# Patient Record
Sex: FEMALE | Race: WHITE | URBAN - METROPOLITAN AREA
[De-identification: names, ages, dates, MRNs, and addresses within clinical notes are randomized per-mention and may not be internally consistent; named-entity substitution may affect disease eponyms.]

---

## 2020-10-19 ENCOUNTER — PROCEDURE (OUTPATIENT)
Dept: URBAN - METROPOLITAN AREA CLINIC 41 | Facility: CLINIC | Age: 80
End: 2020-10-19
Payer: MEDICARE

## 2020-10-19 ASSESSMENT — INTRAOCULAR PRESSURE
OS: 14
OD: 18

## 2020-11-02 ENCOUNTER — PROCEDURE (OUTPATIENT)
Dept: URBAN - METROPOLITAN AREA CLINIC 41 | Facility: CLINIC | Age: 80
End: 2020-11-02
Payer: MEDICARE

## 2020-11-02 PROCEDURE — 67028 INJECTION EYE DRUG: CPT | Performed by: OPHTHALMOLOGY

## 2020-11-02 ASSESSMENT — INTRAOCULAR PRESSURE
OD: 15
OS: 13

## 2020-11-16 ENCOUNTER — PROCEDURE (OUTPATIENT)
Dept: URBAN - METROPOLITAN AREA CLINIC 41 | Facility: CLINIC | Age: 80
End: 2020-11-16
Payer: MEDICARE

## 2020-11-16 PROCEDURE — 92134 CPTRZ OPH DX IMG PST SGM RTA: CPT | Performed by: OPHTHALMOLOGY

## 2020-11-16 ASSESSMENT — INTRAOCULAR PRESSURE
OS: 12
OD: 13

## 2020-12-07 ENCOUNTER — PROCEDURE (OUTPATIENT)
Dept: URBAN - METROPOLITAN AREA CLINIC 41 | Facility: CLINIC | Age: 80
End: 2020-12-07
Payer: MEDICARE

## 2020-12-07 PROCEDURE — 67028 INJECTION EYE DRUG: CPT | Performed by: OPHTHALMOLOGY

## 2020-12-07 ASSESSMENT — INTRAOCULAR PRESSURE
OS: 9
OD: 11

## 2020-12-21 ENCOUNTER — OFFICE VISIT (OUTPATIENT)
Dept: URBAN - METROPOLITAN AREA CLINIC 41 | Facility: CLINIC | Age: 80
End: 2020-12-21
Payer: MEDICARE

## 2020-12-21 DIAGNOSIS — Z96.1 PRESENCE OF INTRAOCULAR LENS: ICD-10-CM

## 2020-12-21 PROCEDURE — 92012 INTRM OPH EXAM EST PATIENT: CPT | Performed by: OPHTHALMOLOGY

## 2020-12-21 PROCEDURE — 92134 CPTRZ OPH DX IMG PST SGM RTA: CPT | Performed by: OPHTHALMOLOGY

## 2020-12-21 ASSESSMENT — INTRAOCULAR PRESSURE
OS: 13
OD: 19

## 2020-12-21 NOTE — IMPRESSION/PLAN
Impression: Vitreous degeneration, bilateral: H43.813. Plan: Patient notes floaters have improved. Exam demonstrates no RT or RD. Precautions discussed.

## 2020-12-21 NOTE — IMPRESSION/PLAN
Impression: PCIOL OU Plan: Stable. Patient notes a decline in vision. Fortunately, there is no change in her PCIOL to explain this.

## 2020-12-21 NOTE — IMPRESSION/PLAN
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 Status: Symptomatic.
s/p Eylea, OU last 10/28/19
s/p Avastin OS last 11/11/19
-- OS has history of improved with q 2 wk antiVEGF injections (alternating between Avastin and Eylea) -- OD no longer requires 2 week alternate injections. -- Cosopt 10/10/16 trial did not improve - s/p Beovu OU last 02/03/20
s/p Eylea 05/18/20 Plan: Patient stable vision OU. Exam and OCT reveal: OD: Resolved IRF/SRF in the right eye s/p Eylea at q4 weeks. OS:  Vision does appear to be worse OS. OCT demonstrates resolved IRF in the left eye s/p Avastin 2 weeks ago. Will require avastin 2 weeks after Eylea injection in the future. Recommend continuing with treatment of Eylea in both eyes today. R/b/A. Patient elects to proceed. Intravitreal Eylea injection performed today into both eyes without complication. 

RTC: 2-3 wks straight Avastin OS, then 2 wks later for Eylea OU, then 3 wks later for Avastin OS, then 2 wks later for [facility] OU, will re-evaluate in March

## 2021-01-11 ENCOUNTER — PROCEDURE (OUTPATIENT)
Dept: URBAN - METROPOLITAN AREA CLINIC 41 | Facility: CLINIC | Age: 81
End: 2021-01-11
Payer: MEDICARE

## 2021-01-11 PROCEDURE — 67028 INJECTION EYE DRUG: CPT | Performed by: OPHTHALMOLOGY

## 2021-01-11 ASSESSMENT — INTRAOCULAR PRESSURE
OS: 12
OD: 15

## 2021-02-01 ENCOUNTER — PROCEDURE (OUTPATIENT)
Dept: URBAN - METROPOLITAN AREA CLINIC 41 | Facility: CLINIC | Age: 81
End: 2021-02-01
Payer: MEDICARE

## 2021-02-01 PROCEDURE — 92134 CPTRZ OPH DX IMG PST SGM RTA: CPT | Performed by: OPHTHALMOLOGY

## 2021-02-01 ASSESSMENT — INTRAOCULAR PRESSURE
OD: 15
OS: 15

## 2021-02-22 ENCOUNTER — PROCEDURE (OUTPATIENT)
Dept: URBAN - METROPOLITAN AREA CLINIC 41 | Facility: CLINIC | Age: 81
End: 2021-02-22
Payer: MEDICARE

## 2021-02-22 PROCEDURE — 67028 INJECTION EYE DRUG: CPT | Performed by: OPHTHALMOLOGY

## 2021-02-22 PROCEDURE — 92134 CPTRZ OPH DX IMG PST SGM RTA: CPT | Performed by: OPHTHALMOLOGY

## 2021-02-22 ASSESSMENT — INTRAOCULAR PRESSURE
OD: 10
OS: 9

## 2021-03-08 ENCOUNTER — PROCEDURE (OUTPATIENT)
Dept: URBAN - METROPOLITAN AREA CLINIC 41 | Facility: CLINIC | Age: 81
End: 2021-03-08
Payer: MEDICARE

## 2021-03-08 PROCEDURE — 92134 CPTRZ OPH DX IMG PST SGM RTA: CPT | Performed by: OPHTHALMOLOGY

## 2021-03-08 ASSESSMENT — INTRAOCULAR PRESSURE
OS: 10
OD: 13

## 2021-03-29 ENCOUNTER — OFFICE VISIT (OUTPATIENT)
Dept: URBAN - METROPOLITAN AREA CLINIC 41 | Facility: CLINIC | Age: 81
End: 2021-03-29
Payer: MEDICARE

## 2021-03-29 DIAGNOSIS — H04.123 DRY EYE SYNDROME OF BILATERAL LACRIMAL GLANDS: ICD-10-CM

## 2021-03-29 PROCEDURE — 92134 CPTRZ OPH DX IMG PST SGM RTA: CPT | Performed by: OPHTHALMOLOGY

## 2021-03-29 PROCEDURE — 92012 INTRM OPH EXAM EST PATIENT: CPT | Performed by: OPHTHALMOLOGY

## 2021-03-29 PROCEDURE — 67028 INJECTION EYE DRUG: CPT | Performed by: OPHTHALMOLOGY

## 2021-03-29 ASSESSMENT — INTRAOCULAR PRESSURE
OS: 9
OD: 11

## 2021-03-29 NOTE — IMPRESSION/PLAN
Impression: Exudative age-rel mclr degn, bi, with actv chrdl neovas: K11.7838 OU. Status: Symptomatic.
s/p Eylea, OU last 10/28/19
s/p Avastin OS last 11/11/19
-- OS has history of improved with q 2 wk antiVEGF injections (alternating between Avastin and Eylea) -- OD no longer requires 2 week alternate injections. -- Cosopt 10/10/16 trial did not improve - s/p Beovu OU last 02/03/20
s/p Eylea 05/18/20 Plan: Patient stable vision OU. Exam and OCT reveal: OD: Resolved IRF/SRF in the right eye s/p Eylea at q4 weeks. OS:  Vision is stable OS. OCT demonstrates resolved IRF in the left eye s/p Eylea 3 weeks ago. Will require avastin 3 weeks after Eylea injection in the future. Recommend continuing with treatment of Avastin OS today and q 5 wk Eylea in both eyes. R/b/A. Patient elects to proceed. Intravitreal Avastin injection performed today into left eye without complication. 

RTC: 2wks straight Eylea OU, then 3 wks straight Avastin OS, then 2 wks later for Eylea OU, then 3 wks later for Avastin OS, then 2 wks later for [facility] OU, will re-evaluate in March

## 2021-04-12 ENCOUNTER — PROCEDURE (OUTPATIENT)
Dept: URBAN - METROPOLITAN AREA CLINIC 41 | Facility: CLINIC | Age: 81
End: 2021-04-12
Payer: MEDICARE

## 2021-04-12 DIAGNOSIS — H35.3231 EXUDATIVE AGE-RELATED MACULAR DEGENERATION, BILATERAL, WITH ACTIVE CHOROIDAL NEOVASCULARIZATION: Primary | ICD-10-CM

## 2021-04-12 PROCEDURE — 92134 CPTRZ OPH DX IMG PST SGM RTA: CPT | Performed by: OPHTHALMOLOGY

## 2021-04-12 ASSESSMENT — INTRAOCULAR PRESSURE
OS: 12
OD: 13

## 2021-05-03 ENCOUNTER — OFFICE VISIT (OUTPATIENT)
Dept: URBAN - METROPOLITAN AREA CLINIC 41 | Facility: CLINIC | Age: 81
End: 2021-05-03
Payer: MEDICARE

## 2021-05-03 PROCEDURE — 67028 INJECTION EYE DRUG: CPT | Performed by: OPHTHALMOLOGY

## 2021-05-03 ASSESSMENT — INTRAOCULAR PRESSURE
OS: 15
OD: 16

## 2021-05-17 ENCOUNTER — PROCEDURE (OUTPATIENT)
Dept: URBAN - METROPOLITAN AREA CLINIC 41 | Facility: CLINIC | Age: 81
End: 2021-05-17
Payer: MEDICARE

## 2021-05-17 PROCEDURE — 92134 CPTRZ OPH DX IMG PST SGM RTA: CPT | Performed by: OPHTHALMOLOGY

## 2021-05-17 ASSESSMENT — INTRAOCULAR PRESSURE
OS: 14
OD: 13

## 2021-06-07 ENCOUNTER — PROCEDURE (OUTPATIENT)
Dept: URBAN - METROPOLITAN AREA CLINIC 41 | Facility: CLINIC | Age: 81
End: 2021-06-07
Payer: MEDICARE

## 2021-06-07 PROCEDURE — 92134 CPTRZ OPH DX IMG PST SGM RTA: CPT | Performed by: OPHTHALMOLOGY

## 2021-06-07 PROCEDURE — 67028 INJECTION EYE DRUG: CPT | Performed by: OPHTHALMOLOGY

## 2021-06-07 ASSESSMENT — INTRAOCULAR PRESSURE
OD: 15
OS: 14

## 2021-06-21 ENCOUNTER — PROCEDURE (OUTPATIENT)
Dept: URBAN - METROPOLITAN AREA CLINIC 41 | Facility: CLINIC | Age: 81
End: 2021-06-21
Payer: MEDICARE

## 2021-06-21 PROCEDURE — 92134 CPTRZ OPH DX IMG PST SGM RTA: CPT | Performed by: OPHTHALMOLOGY

## 2021-06-21 ASSESSMENT — INTRAOCULAR PRESSURE
OS: 13
OD: 14

## 2021-07-19 ENCOUNTER — PROCEDURE (OUTPATIENT)
Dept: URBAN - METROPOLITAN AREA CLINIC 41 | Facility: CLINIC | Age: 81
End: 2021-07-19
Payer: MEDICARE

## 2021-07-19 PROCEDURE — 92134 CPTRZ OPH DX IMG PST SGM RTA: CPT | Performed by: OPHTHALMOLOGY

## 2021-07-19 ASSESSMENT — INTRAOCULAR PRESSURE
OD: 14
OS: 13

## 2021-08-02 ENCOUNTER — OFFICE VISIT (OUTPATIENT)
Dept: URBAN - METROPOLITAN AREA CLINIC 41 | Facility: CLINIC | Age: 81
End: 2021-08-02
Payer: MEDICARE

## 2021-08-02 PROCEDURE — 92134 CPTRZ OPH DX IMG PST SGM RTA: CPT | Performed by: OPHTHALMOLOGY

## 2021-08-02 PROCEDURE — 92014 COMPRE OPH EXAM EST PT 1/>: CPT | Performed by: OPHTHALMOLOGY

## 2021-08-02 ASSESSMENT — INTRAOCULAR PRESSURE
OD: 13
OS: 13

## 2021-08-02 NOTE — IMPRESSION/PLAN
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 Status: Symptomatic.
s/p Eylea, OU last 06/21/2021
s/p Avastin OS last 07/19/2021
s/p Beovu OU last 02/03/20
-- OS has history of improved with q 2 wk antiVEGF injections (alternating between Avastin and Eylea) -- OD no longer requires 2 week alternate injections. -- Cosopt 10/10/16 trial did not improve Plan: Patient stable vision OU. Exam and OCT reveal: OD: Resolved IRF/SRF in the right eye s/p Eylea at q5-6 weeks. OS:  Vision is stable OS. OCT demonstrates resolved IRF in the left eye s/p Avastin 2 wks ago. Will require avastin 3 weeks after Eylea injection in the future. Recommend continuing with treatment of Eylea in both eyes today. R/b/A. Patient elects to proceed. Intravitreal Eylea injection performed today into OU without complication. 

RTC: 2wks straight Avastin, then 2 wks later for Eylea OU, then 3 wks later for Avastin OS, then 2 wks later for [facility] OU, will re-evaluate in ~October

## 2021-08-02 NOTE — IMPRESSION/PLAN
Impression: Dry eye syndrome of bilateral lacrimal glands: H04.123. Plan: Patient notes tearing. Exam demonstrates PEE. Discussed R/B/A of ATs, PFATs, Restasis, vs punctal plugs.  Rec ATs + restasis

## 2021-08-02 NOTE — IMPRESSION/PLAN
Impression: Vitreous degeneration, bilateral: H43.813. Plan: Patient notes decrease floaters and flashes. Exam demonstrates no RT or RD. Precautions discussed.

## 2021-08-16 ENCOUNTER — OFFICE VISIT (OUTPATIENT)
Dept: URBAN - METROPOLITAN AREA CLINIC 41 | Facility: CLINIC | Age: 81
End: 2021-08-16
Payer: MEDICARE

## 2021-08-16 PROCEDURE — 92134 CPTRZ OPH DX IMG PST SGM RTA: CPT | Performed by: OPHTHALMOLOGY

## 2021-08-16 ASSESSMENT — INTRAOCULAR PRESSURE
OS: 14
OD: 14

## 2021-08-30 ENCOUNTER — PROCEDURE (OUTPATIENT)
Dept: URBAN - METROPOLITAN AREA CLINIC 41 | Facility: CLINIC | Age: 81
End: 2021-08-30
Payer: MEDICARE

## 2021-08-30 PROCEDURE — 92134 CPTRZ OPH DX IMG PST SGM RTA: CPT | Performed by: OPHTHALMOLOGY

## 2021-08-30 ASSESSMENT — INTRAOCULAR PRESSURE
OS: 12
OD: 16

## 2021-09-20 ENCOUNTER — PROCEDURE (OUTPATIENT)
Dept: URBAN - METROPOLITAN AREA CLINIC 41 | Facility: CLINIC | Age: 81
End: 2021-09-20
Payer: MEDICARE

## 2021-09-20 PROCEDURE — 67028 INJECTION EYE DRUG: CPT | Performed by: OPHTHALMOLOGY

## 2021-09-20 PROCEDURE — 92134 CPTRZ OPH DX IMG PST SGM RTA: CPT | Performed by: OPHTHALMOLOGY

## 2021-09-20 ASSESSMENT — INTRAOCULAR PRESSURE
OS: 15
OD: 14

## 2021-10-04 ENCOUNTER — PROCEDURE (OUTPATIENT)
Dept: URBAN - METROPOLITAN AREA CLINIC 41 | Facility: CLINIC | Age: 81
End: 2021-10-04
Payer: MEDICARE

## 2021-10-04 ASSESSMENT — INTRAOCULAR PRESSURE
OS: 14
OD: 15

## 2021-10-25 ENCOUNTER — OFFICE VISIT (OUTPATIENT)
Dept: URBAN - METROPOLITAN AREA CLINIC 41 | Facility: CLINIC | Age: 81
End: 2021-10-25
Payer: MEDICARE

## 2021-10-25 PROCEDURE — 92012 INTRM OPH EXAM EST PATIENT: CPT | Performed by: OPHTHALMOLOGY

## 2021-10-25 PROCEDURE — 92134 CPTRZ OPH DX IMG PST SGM RTA: CPT | Performed by: OPHTHALMOLOGY

## 2021-10-25 PROCEDURE — 67028 INJECTION EYE DRUG: CPT | Performed by: OPHTHALMOLOGY

## 2021-10-25 ASSESSMENT — INTRAOCULAR PRESSURE
OS: 12
OD: 15

## 2021-10-25 NOTE — IMPRESSION/PLAN
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 Status: Symptomatic.
s/p Eylea, OU last 10/04/2021
s/p Avastin OS last 09/20/2021
s/p Beovu OU last 02/03/20
-- OS has history of improved with q 2 wk antiVEGF injections (alternating between Avastin and Eylea) -- OD no longer requires 2 week alternate injections. -- Cosopt 10/10/16 trial did not improve Plan: Patient stable vision OU. Exam and OCT reveal: OD: Resolved IRF/SRF in the right eye s/p Eylea at q 5-6 week interval; last Eylea 10/4/2021 ~ 3 wks ago. OS:  Vision is stable OS. OCT demonstrates resolved IRF in the left eye s/p Eylea 3 wks ago. She is usually 2wks s/p Avastin and 3 wks after each Eylea. Will require avastin 3 weeks after Eylea injection in the future. Recommend Avastin OS today and continuing with treatment of Eylea in both eyes at next visit. R/b/A. Patient elects to proceed. Intravitreal Avastin injected OS today without complication.  

RTC: 2wks straight Eylea OU, then 3 wks later for Avastin OS, then 2 wks later for [facility] OU, will re-evaluate in ~January to February

## 2021-11-08 ENCOUNTER — PROCEDURE (OUTPATIENT)
Dept: URBAN - METROPOLITAN AREA CLINIC 41 | Facility: CLINIC | Age: 81
End: 2021-11-08
Payer: MEDICARE

## 2021-11-08 PROCEDURE — 92134 CPTRZ OPH DX IMG PST SGM RTA: CPT | Performed by: OPHTHALMOLOGY

## 2021-11-08 ASSESSMENT — INTRAOCULAR PRESSURE
OD: 14
OS: 13

## 2021-11-29 ENCOUNTER — PROCEDURE (OUTPATIENT)
Dept: URBAN - METROPOLITAN AREA CLINIC 41 | Facility: CLINIC | Age: 81
End: 2021-11-29
Payer: MEDICARE

## 2021-11-29 PROCEDURE — 92134 CPTRZ OPH DX IMG PST SGM RTA: CPT | Performed by: OPHTHALMOLOGY

## 2021-11-29 PROCEDURE — 67028 INJECTION EYE DRUG: CPT | Performed by: OPHTHALMOLOGY

## 2021-11-29 ASSESSMENT — INTRAOCULAR PRESSURE
OD: 14
OS: 13

## 2021-12-13 ENCOUNTER — PROCEDURE (OUTPATIENT)
Dept: URBAN - METROPOLITAN AREA CLINIC 41 | Facility: CLINIC | Age: 81
End: 2021-12-13
Payer: MEDICARE

## 2021-12-13 PROCEDURE — 92134 CPTRZ OPH DX IMG PST SGM RTA: CPT | Performed by: OPHTHALMOLOGY

## 2021-12-13 ASSESSMENT — INTRAOCULAR PRESSURE
OS: 12
OD: 10

## 2022-01-03 ENCOUNTER — PROCEDURE (OUTPATIENT)
Dept: URBAN - METROPOLITAN AREA CLINIC 41 | Facility: CLINIC | Age: 82
End: 2022-01-03
Payer: MEDICARE

## 2022-01-03 PROCEDURE — 67028 INJECTION EYE DRUG: CPT | Performed by: OPHTHALMOLOGY

## 2022-01-03 PROCEDURE — 92134 CPTRZ OPH DX IMG PST SGM RTA: CPT | Performed by: OPHTHALMOLOGY

## 2022-01-03 ASSESSMENT — INTRAOCULAR PRESSURE
OD: 18
OS: 10

## 2022-01-17 ENCOUNTER — OFFICE VISIT (OUTPATIENT)
Dept: URBAN - METROPOLITAN AREA CLINIC 41 | Facility: CLINIC | Age: 82
End: 2022-01-17
Payer: MEDICARE

## 2022-01-17 PROCEDURE — 92014 COMPRE OPH EXAM EST PT 1/>: CPT | Performed by: OPHTHALMOLOGY

## 2022-01-17 PROCEDURE — 92134 CPTRZ OPH DX IMG PST SGM RTA: CPT | Performed by: OPHTHALMOLOGY

## 2022-01-17 ASSESSMENT — INTRAOCULAR PRESSURE
OS: 15
OD: 16

## 2022-01-17 NOTE — IMPRESSION/PLAN
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 Status: Symptomatic.
s/p Eylea, OU last 12/13/2021
s/p Avastin OS last 01/03/2022
s/p Beovu OU last 02/03/20
-- OS has history of improved with q 2 wk antiVEGF injections (alternating between Avastin and Eylea) -- OD no longer requires 2 week alternate injections. -- Cosopt 10/10/16 trial did not improve Plan: Patient notes a decline in vision OU. Exam and OCT reveal: OD: Recurrent SRH right eye s/p Eylea at q 5-6 week interval; last Eylea 12/13/2021. OS: Vision is slightly worse OS. OCT demonstrates resolved IRF in the left eye s/p Eylea 5 wks ago and Avastin 2 wks ago. She is usually 2wks s/p Avastin and 3 wks after each Eylea. Recommend Eylea in both eyes today and will increase to Avastin OU at next visit. R/b/A. Patient elects to proceed. Intravitreal Eylea injected OU today without complication.  

RTC: 2 wks straight Avastin OU, 2 wks later straight Eylea OU, then 2 wks later for Avastin OS, then 2 wks later for [facility] OU, will re-evaluate in  end of March

## 2022-01-17 NOTE — IMPRESSION/PLAN
Impression: PCIOL OU Plan: Stable. Patient notes a decline in vision. Exam demonstrate capsular phimosis and phacodynesis and tr PCO. Ok to follow up for updated Mrx.

## 2022-01-31 ENCOUNTER — PROCEDURE (OUTPATIENT)
Dept: URBAN - METROPOLITAN AREA CLINIC 41 | Facility: CLINIC | Age: 82
End: 2022-01-31
Payer: MEDICARE

## 2022-01-31 PROCEDURE — 92134 CPTRZ OPH DX IMG PST SGM RTA: CPT | Performed by: OPHTHALMOLOGY

## 2022-01-31 ASSESSMENT — INTRAOCULAR PRESSURE
OS: 17
OD: 17

## 2022-02-14 ENCOUNTER — PROCEDURE (OUTPATIENT)
Dept: URBAN - METROPOLITAN AREA CLINIC 41 | Facility: CLINIC | Age: 82
End: 2022-02-14
Payer: MEDICARE

## 2022-02-14 PROCEDURE — 92134 CPTRZ OPH DX IMG PST SGM RTA: CPT | Performed by: OPHTHALMOLOGY

## 2022-02-14 ASSESSMENT — INTRAOCULAR PRESSURE
OS: 12
OD: 13

## 2022-02-28 ENCOUNTER — OFFICE VISIT (OUTPATIENT)
Dept: URBAN - METROPOLITAN AREA CLINIC 41 | Facility: CLINIC | Age: 82
End: 2022-02-28
Payer: MEDICARE

## 2022-02-28 PROCEDURE — 92134 CPTRZ OPH DX IMG PST SGM RTA: CPT | Performed by: OPHTHALMOLOGY

## 2022-02-28 PROCEDURE — 67028 INJECTION EYE DRUG: CPT | Performed by: OPHTHALMOLOGY

## 2022-02-28 ASSESSMENT — INTRAOCULAR PRESSURE
OD: 10
OS: 10

## 2022-03-24 ENCOUNTER — OFFICE VISIT (OUTPATIENT)
Dept: URBAN - METROPOLITAN AREA CLINIC 31 | Facility: CLINIC | Age: 82
End: 2022-03-24
Payer: MEDICARE

## 2022-03-24 DIAGNOSIS — H43.813 VITREOUS DEGENERATION, BILATERAL: ICD-10-CM

## 2022-03-24 PROCEDURE — 92014 COMPRE OPH EXAM EST PT 1/>: CPT | Performed by: OPHTHALMOLOGY

## 2022-03-24 PROCEDURE — 92134 CPTRZ OPH DX IMG PST SGM RTA: CPT | Performed by: OPHTHALMOLOGY

## 2022-03-24 ASSESSMENT — INTRAOCULAR PRESSURE
OD: 17
OS: 13

## 2022-03-24 NOTE — IMPRESSION/PLAN
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 Status: Symptomatic.
s/p Eylea, OU last 12/13/2021
s/p Avastin OS last 01/03/2022
s/p Beovu OU last 02/03/20
-- OS has history of improved with q 2 wk antiVEGF injections (alternating between Avastin and Eylea) -- OD once again requires 2 week alternate injections. -- Cosopt 10/10/16 trial did not improve Plan: Patient notes a decline in vision OU. Exam and OCT reveal: OD: Recurrent SRF right eye s/p Eylea at 4 wks interval
OS: Vision is slightly worse OS. OCT demonstrates resolved IRF in the left eye s/p Eylea 4 wks ago and Avastin 6 wks ago. She is usually 2wks s/p Avastin and 3 wks after each Eylea. Recommend Eylea in both eyes today and will increase to Avastin OU at next visit. R/b/A. Patient elects to proceed. Intravitreal Eylea injected OU today without complication.  

RTC: 2 wks straight Avastin OU, 2 wks later straight Eylea OU, then 2 wks later for Avastin OS, then 2 wks later for [facility] OU, will re-evaluate in  end of March

## 2022-03-24 NOTE — IMPRESSION/PLAN
Impression: Dry eye syndrome of bilateral lacrimal glands: H04.123. Plan: Patient notes occ irritation. Exam demonstrates PEE. Discussed R/B/A of ATs, PFATs, Restasis, vs punctal plugs. Rec cont ATs.   Ok to hold off of restasis

## 2022-04-11 ENCOUNTER — PROCEDURE (OUTPATIENT)
Dept: URBAN - METROPOLITAN AREA CLINIC 41 | Facility: CLINIC | Age: 82
End: 2022-04-11
Payer: MEDICARE

## 2022-04-11 DIAGNOSIS — H35.3231 EXUDATIVE AGE-RELATED MACULAR DEGENERATION, BILATERAL, WITH ACTIVE CHOROIDAL NEOVASCULARIZATION: Primary | ICD-10-CM

## 2022-04-11 PROCEDURE — 67028 INJECTION EYE DRUG: CPT | Performed by: OPHTHALMOLOGY

## 2022-04-11 PROCEDURE — 92134 CPTRZ OPH DX IMG PST SGM RTA: CPT | Performed by: OPHTHALMOLOGY

## 2022-04-11 ASSESSMENT — INTRAOCULAR PRESSURE
OD: 16
OS: 14

## 2022-04-25 ENCOUNTER — OFFICE VISIT (OUTPATIENT)
Dept: URBAN - METROPOLITAN AREA CLINIC 41 | Facility: CLINIC | Age: 82
End: 2022-04-25
Payer: MEDICARE

## 2022-04-25 DIAGNOSIS — H43.813 VITREOUS DEGENERATION, BILATERAL: ICD-10-CM

## 2022-04-25 DIAGNOSIS — H35.3231 EXUDATIVE AGE-REL MCLR DEGN, BI, WITH ACTV CHRDL NEOVAS: Primary | ICD-10-CM

## 2022-04-25 DIAGNOSIS — H04.123 DRY EYE SYNDROME OF BILATERAL LACRIMAL GLANDS: ICD-10-CM

## 2022-04-25 DIAGNOSIS — Z96.1 PRESENCE OF INTRAOCULAR LENS: ICD-10-CM

## 2022-04-25 PROCEDURE — 92134 CPTRZ OPH DX IMG PST SGM RTA: CPT | Performed by: OPHTHALMOLOGY

## 2022-04-25 PROCEDURE — 92014 COMPRE OPH EXAM EST PT 1/>: CPT | Performed by: OPHTHALMOLOGY

## 2022-04-25 ASSESSMENT — INTRAOCULAR PRESSURE
OD: 14
OS: 9

## 2022-04-25 NOTE — IMPRESSION/PLAN
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 Status: Symptomatic.
s/p Eylea, OU last 3/24/2022
s/p Avastin OS last 4/11/2022
s/p Beovu OU last 02/03/20
-- OS has history of improved with q 2 wk antiVEGF injections (alternating between Avastin and Eylea) -- OD once again requires 2 week alternate injections. -- Cosopt 10/10/16 trial did not improve Plan: Patient notes a decline in vision OU. Exam and OCT reveal: OD: Recurrent SRF resolved right eye with increase in antiVEGF to q 2 wks interval.  
OS: Vision is stable OS. OCT demonstrates resolved IRF in the left eye s/p Eylea 4 wks ago and Avastin 2 wks ago. She is usually 2wks s/p Avastin and 3 wks after each Eylea. Recommend Eylea in both eyes today and will increase to Avastin OU at next visit. R/b/A. Patient elects to proceed. Intravitreal Eylea injected OU today without complication.  

RTC: 3 wks straight Avastin OU, 2 wks later straight Eylea OU, then 2 wks later for Avastin OU, then 2 wks later for [facility] OU, will re-evaluate in end of June; will be in [place] from July to September

## 2022-05-13 ENCOUNTER — PROCEDURE (OUTPATIENT)
Dept: URBAN - METROPOLITAN AREA CLINIC 41 | Facility: CLINIC | Age: 82
End: 2022-05-13
Payer: MEDICARE

## 2022-05-13 PROCEDURE — 92134 CPTRZ OPH DX IMG PST SGM RTA: CPT | Performed by: OPHTHALMOLOGY

## 2022-05-13 ASSESSMENT — INTRAOCULAR PRESSURE
OD: 13
OS: 10

## 2022-05-27 ENCOUNTER — PROCEDURE (OUTPATIENT)
Dept: URBAN - METROPOLITAN AREA CLINIC 41 | Facility: CLINIC | Age: 82
End: 2022-05-27
Payer: MEDICARE

## 2022-05-27 PROCEDURE — 92134 CPTRZ OPH DX IMG PST SGM RTA: CPT | Performed by: OPHTHALMOLOGY

## 2022-05-27 ASSESSMENT — INTRAOCULAR PRESSURE
OS: 11
OD: 12

## 2022-06-15 ENCOUNTER — PROCEDURE (OUTPATIENT)
Dept: URBAN - METROPOLITAN AREA CLINIC 41 | Facility: CLINIC | Age: 82
End: 2022-06-15
Payer: MEDICARE

## 2022-06-15 DIAGNOSIS — H35.3231 EXUDATIVE AGE-RELATED MACULAR DEGENERATION, BILATERAL, WITH ACTIVE CHOROIDAL NEOVASCULARIZATION: Primary | ICD-10-CM

## 2022-06-15 ASSESSMENT — INTRAOCULAR PRESSURE
OS: 10
OD: 9

## 2022-06-24 ENCOUNTER — OFFICE VISIT (OUTPATIENT)
Dept: URBAN - METROPOLITAN AREA CLINIC 41 | Facility: CLINIC | Age: 82
End: 2022-06-24
Payer: MEDICARE

## 2022-06-24 DIAGNOSIS — Z96.1 PRESENCE OF INTRAOCULAR LENS: ICD-10-CM

## 2022-06-24 DIAGNOSIS — H04.123 DRY EYE SYNDROME OF BILATERAL LACRIMAL GLANDS: ICD-10-CM

## 2022-06-24 DIAGNOSIS — H43.813 VITREOUS DEGENERATION, BILATERAL: ICD-10-CM

## 2022-06-24 DIAGNOSIS — H35.3231 EXUDATIVE AGE-REL MCLR DEGN, BI, WITH ACTV CHRDL NEOVAS: Primary | ICD-10-CM

## 2022-06-24 PROCEDURE — 92134 CPTRZ OPH DX IMG PST SGM RTA: CPT | Performed by: OPHTHALMOLOGY

## 2022-06-24 PROCEDURE — 92014 COMPRE OPH EXAM EST PT 1/>: CPT | Performed by: OPHTHALMOLOGY

## 2022-06-24 ASSESSMENT — INTRAOCULAR PRESSURE
OS: 16
OD: 17

## 2022-06-24 NOTE — IMPRESSION/PLAN
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 Status: Symptomatic.
s/p Eylea, OU last 05/27/2022
s/p Avastin OU last 06/15/2022
s/p Beovu OU last 02/03/2020
-- OS has history of improved with q 2-3 wk antiVEGF injections (alternating between Avastin and Eylea) -- OD once again requires 2-3 week alternating injections. Plan: Exam and OCT reveal: OD: Recurrent SRF resolved right eye with increase in antiVEGF to q 2-3 wks interval.  She is usually 2wks s/p Avastin and 2-3 wks after each Eylea. OS: Vision is stable OS. OCT demonstrates resolved IRF in the left eye s/p Eylea 4 wks ago and Avastin 2 wks ago. Recommend Eylea in both eyes today and Avastin OU tx in 2-3 wks at next visit (will be in [place]). R/b/A. Patient elects to proceed. Intravitreal Eylea injected OU today without complication.  

RTC: RTC 3 wks re-eval Avastin OU, then 2 wks later re-eval Eylea OU  (will be in [place] from July to September)

## 2022-10-05 ENCOUNTER — OFFICE VISIT (OUTPATIENT)
Dept: URBAN - METROPOLITAN AREA CLINIC 41 | Facility: CLINIC | Age: 82
End: 2022-10-05
Payer: MEDICARE

## 2022-10-05 DIAGNOSIS — H04.123 DRY EYE SYNDROME OF BILATERAL LACRIMAL GLANDS: ICD-10-CM

## 2022-10-05 DIAGNOSIS — H43.813 VITREOUS DEGENERATION, BILATERAL: ICD-10-CM

## 2022-10-05 DIAGNOSIS — H35.3231 EXUDATIVE AGE-REL MCLR DEGN, BI, WITH ACTV CHRDL NEOVAS: Primary | ICD-10-CM

## 2022-10-05 DIAGNOSIS — Z96.1 PRESENCE OF INTRAOCULAR LENS: ICD-10-CM

## 2022-10-05 PROCEDURE — 99214 OFFICE O/P EST MOD 30 MIN: CPT | Performed by: OPHTHALMOLOGY

## 2022-10-05 PROCEDURE — 92134 CPTRZ OPH DX IMG PST SGM RTA: CPT | Performed by: OPHTHALMOLOGY

## 2022-10-05 ASSESSMENT — INTRAOCULAR PRESSURE
OS: 12
OD: 13

## 2022-10-05 NOTE — IMPRESSION/PLAN
Impression: Exudative age-rel mclr degn, bi, with actv chrdl neovas: N91.0633 OU. Status: Symptomatic.
s/p Eylea, OU last 05/27/2022, 09/1/2022([place]) s/p Avastin OU last 06/15/2022-RCA; 
s/p Beovu OU last 02/03/2020
-- OS has history of improved with q 2-3 wk antiVEGF injections (alternating between Avastin and Eylea) -- OD once again requires 2-3 week alternating injections. Plan: Last tx was ~ 5 wk. Exam and OCT reveal: OD: Recurrent SRF right eye at 5 wks instead of antiVEGF at q 2-3 wks interval.  She is usually 2wks s/p Avastin and 2-3 wks after each Eylea. OS: Vision is stable OS. OCT demonstrates recurrent IRF in the left eye s/p Eylea 5 wks ago instaed of antiVEGF q 2-3 wks. Recommend Eylea in both eyes today and may need to switch to Vabysmo at next visit vs increase to q 2-3 wks tx alternating between Avastin and Eylea. Intravitreal Eylea injected OU today without complication.  

RTC: RCA PRN (will be moving to [facility])

## 2022-10-05 NOTE — IMPRESSION/PLAN
Impression: PCIOL OU Plan: Patient notes stable vision. Exam demonstrate capsular phimosis and phacodynesis and tr PCO. This is stable.